# Patient Record
Sex: MALE | Race: WHITE
[De-identification: names, ages, dates, MRNs, and addresses within clinical notes are randomized per-mention and may not be internally consistent; named-entity substitution may affect disease eponyms.]

---

## 2020-07-31 ENCOUNTER — HOSPITAL ENCOUNTER (OUTPATIENT)
Dept: HOSPITAL 19 - COL.RAD | Age: 40
End: 2020-07-31
Attending: SURGERY
Payer: OTHER GOVERNMENT

## 2020-07-31 DIAGNOSIS — K44.9: Primary | ICD-10-CM

## 2020-07-31 DIAGNOSIS — K21.0: ICD-10-CM

## 2020-09-16 ENCOUNTER — HOSPITAL ENCOUNTER (OUTPATIENT)
Dept: HOSPITAL 19 - SDCO | Age: 40
LOS: 1 days | Discharge: HOME | End: 2020-09-17
Attending: SURGERY
Payer: OTHER GOVERNMENT

## 2020-09-16 VITALS — SYSTOLIC BLOOD PRESSURE: 148 MMHG | HEART RATE: 84 BPM | DIASTOLIC BLOOD PRESSURE: 92 MMHG

## 2020-09-16 VITALS — BODY MASS INDEX: 26.96 KG/M2 | HEIGHT: 68 IN | WEIGHT: 177.91 LBS

## 2020-09-16 VITALS — DIASTOLIC BLOOD PRESSURE: 93 MMHG | SYSTOLIC BLOOD PRESSURE: 144 MMHG | HEART RATE: 82 BPM | TEMPERATURE: 97.5 F

## 2020-09-16 VITALS — HEART RATE: 72 BPM | TEMPERATURE: 97.6 F

## 2020-09-16 VITALS — DIASTOLIC BLOOD PRESSURE: 70 MMHG | SYSTOLIC BLOOD PRESSURE: 125 MMHG | HEART RATE: 96 BPM | TEMPERATURE: 98.8 F

## 2020-09-16 VITALS — SYSTOLIC BLOOD PRESSURE: 146 MMHG | HEART RATE: 87 BPM | DIASTOLIC BLOOD PRESSURE: 91 MMHG

## 2020-09-16 VITALS — DIASTOLIC BLOOD PRESSURE: 94 MMHG | SYSTOLIC BLOOD PRESSURE: 143 MMHG | HEART RATE: 87 BPM

## 2020-09-16 VITALS — HEART RATE: 79 BPM | SYSTOLIC BLOOD PRESSURE: 143 MMHG | DIASTOLIC BLOOD PRESSURE: 91 MMHG

## 2020-09-16 VITALS — SYSTOLIC BLOOD PRESSURE: 143 MMHG | HEART RATE: 84 BPM | DIASTOLIC BLOOD PRESSURE: 88 MMHG

## 2020-09-16 VITALS — HEART RATE: 96 BPM | DIASTOLIC BLOOD PRESSURE: 83 MMHG | SYSTOLIC BLOOD PRESSURE: 139 MMHG

## 2020-09-16 DIAGNOSIS — K21.0: Primary | ICD-10-CM

## 2020-09-16 DIAGNOSIS — K44.9: ICD-10-CM

## 2020-09-16 DIAGNOSIS — Z20.828: ICD-10-CM

## 2020-09-16 PROCEDURE — A9284 NON-ELECTRONIC SPIROMETER: HCPCS

## 2020-09-16 NOTE — NUR
Patient awaken sitting in bed. A&Ox3, denies pain. VSS. Reporting some
discomfort in face. Crepitus noted in upper torso, by clavicals and in face,
checks and neck. Voice also sounds nasally. Doctor Kasey notified and just
continued monitoring the patient. Patient informed that it will go away with
time and to call nursing staff for any concerns. No further needs expressed
from the patient. Call light within reach

## 2020-09-16 NOTE — NUR
Patient reports inability to have a deep breath at times. Patient educated on
ambulating and given IS by respiratory. Patient has crepitus and is 93% on
room air. Will continue to monitor.

## 2020-09-16 NOTE — NUR
PATIENT ADMITED INTO ROOM 322-2 POST OP ROBOTIC NISSEN WITH HERNIA REPAIR. ABD
LAP SITES X6 ARE CD&I AND CLOSED WITH GLUE. ABD IS DISTENDED, SL FIRM AND WITH
POSITIVE BOWL SOUNDS. NO C/O N/V. NOTED CREPITUS IN FACE, NECK & COLLAR BONE.
PATIENT C/O SL GAS PAINS IN SHOULDERS. NURSING EDUCATED PATIENT AND WIFE
ABOUT POST OP ROBOTIC PROCEDURE AND ERAS PROTOCOL. HEAD TO TOE ASSESSMENT
COMPLETE. ORIENTED TO ROOM. CALL LIGHT IN REACH. WIFE AT BEDSIDE.

## 2020-09-16 NOTE — NUR
Patient sitting up in bed eatting dinner. No complaints of pain or nausea.
VSS. A&O. IV CDI. No further needs expressed from the patient. Call light
within reach.

## 2020-09-16 NOTE — NUR
PATIENT ABLE TO VOID 200CC POST OP USING THE URINAL. PATIENT C/O SL NAUSEA, NO
EMESIS. GAVE PRN ZOFRAN. LIQUIDS AT BEDSIDE. PATIENT RESTING. WIFE AT BEDSIDE.

## 2020-09-16 NOTE — NUR
Resting in bed. Assessment complete. Lungs clear. Heart sounds normal. Bowels
hypoactive at this time. Pulses present throughout. No edema noted. INT left
wrist without complications. Denies pain but reports discomfort in upper chest
and facial areas. Patient has crepitus. Educated to ambulate this evening.
Denies other needs at this time. Denies needs for tylenol. Will monitor.

## 2020-09-17 VITALS — SYSTOLIC BLOOD PRESSURE: 123 MMHG | TEMPERATURE: 99.1 F | HEART RATE: 94 BPM | DIASTOLIC BLOOD PRESSURE: 69 MMHG

## 2020-09-17 VITALS — SYSTOLIC BLOOD PRESSURE: 132 MMHG | HEART RATE: 85 BPM | DIASTOLIC BLOOD PRESSURE: 68 MMHG | TEMPERATURE: 98.9 F

## 2020-09-17 VITALS — SYSTOLIC BLOOD PRESSURE: 127 MMHG | DIASTOLIC BLOOD PRESSURE: 76 MMHG | TEMPERATURE: 98.5 F | HEART RATE: 90 BPM

## 2020-09-17 NOTE — NUR
PATIENT IS A&O. VSS. REPORTS ABD TENDERNESS AS MILD AND DENIES NEED FOR PAIN
MEDS. ABD IS SL DISTENDEND, MORE SOFT THAN YESTERDAY, AND WITH POSITIVE BOWL
SOUNDS. NO C/O N/V. TOLERATING CLEAR LIQUID DIET WELL. IV TO INT. ABD LAP
SITES X6 ARE CD&I WITH GLUED CLOSURE. HEAD TO TOE ASSESSMENT COMPLETE. STUDENT
NURSE WORKING WITH PATIENT TODAY, SEE STUDENT CHARTING. PATIENT HOPING TO
DISCHARGE HOME LATER TODAY.

## 2020-09-17 NOTE — NUR
Patient had uneventful night. Mild discomfort with crepitus. Resting in bed
this AM. Call light in reach.

## 2020-09-17 NOTE — NUR
PATIENT DISCHARGING HOME VIA AMBULATORY TO PERSONAL VEHICLE. GAVE DISCHARGE
INSTRUCTIONS, PRESCRIPTION AND F/U APT. ANSWERED ALL QUESTIONS/CONCERNS.
STUDENT NURSE DC'D IV SITE, SEE CHARTING. PATIENT DRESSED AND PACKED PERSONAL
BELONGINGS. PATIENT DISCHARGED.

## 2020-09-17 NOTE — NUR
Pt reports having "slight cough that is sometimes productive" and "It doesn't
happen very often. Just a couple of times since surgery." Pt did not
display productive cough during nursing assessment for evaluation. Education
on IS and splenting ABD during cough provided. Patient verbally displayed
understanding of teachings.

## 2021-01-29 ENCOUNTER — HOSPITAL ENCOUNTER (OUTPATIENT)
Dept: HOSPITAL 19 - SDCO | Age: 41
Discharge: HOME | End: 2021-01-29
Attending: SPECIALIST
Payer: OTHER GOVERNMENT

## 2021-01-29 VITALS — HEIGHT: 68 IN | WEIGHT: 172.84 LBS | BODY MASS INDEX: 26.2 KG/M2

## 2021-01-29 VITALS — SYSTOLIC BLOOD PRESSURE: 131 MMHG | HEART RATE: 77 BPM | DIASTOLIC BLOOD PRESSURE: 98 MMHG

## 2021-01-29 VITALS — SYSTOLIC BLOOD PRESSURE: 139 MMHG | HEART RATE: 63 BPM | DIASTOLIC BLOOD PRESSURE: 90 MMHG

## 2021-01-29 VITALS — SYSTOLIC BLOOD PRESSURE: 134 MMHG | HEART RATE: 78 BPM | TEMPERATURE: 97.6 F | DIASTOLIC BLOOD PRESSURE: 96 MMHG

## 2021-01-29 VITALS — TEMPERATURE: 97.3 F | DIASTOLIC BLOOD PRESSURE: 89 MMHG | SYSTOLIC BLOOD PRESSURE: 125 MMHG | HEART RATE: 81 BPM

## 2021-01-29 VITALS — SYSTOLIC BLOOD PRESSURE: 124 MMHG | DIASTOLIC BLOOD PRESSURE: 86 MMHG | HEART RATE: 70 BPM

## 2021-01-29 DIAGNOSIS — K64.4: ICD-10-CM

## 2021-01-29 DIAGNOSIS — K64.2: ICD-10-CM

## 2021-01-29 DIAGNOSIS — K58.9: ICD-10-CM

## 2021-01-29 DIAGNOSIS — K92.1: Primary | ICD-10-CM

## 2021-01-29 DIAGNOSIS — E78.00: ICD-10-CM

## 2021-01-29 DIAGNOSIS — Z98.52: ICD-10-CM

## 2021-01-29 DIAGNOSIS — E78.5: ICD-10-CM

## 2021-01-29 DIAGNOSIS — Z20.822: ICD-10-CM

## 2021-01-29 DIAGNOSIS — K21.9: ICD-10-CM

## 2021-01-29 DIAGNOSIS — I10: ICD-10-CM

## 2021-01-29 NOTE — NUR
PT RETURNED PER CART. PT A/OX3, DENIES PAIN, NAUSEA OR VOMITING. VSS,
AFEBRILE. LUNGS CLEAR, HRR, BOWEL SOUNDS AUDIBLE. PT REQUESTING APPLESAUCE AND
APPLE JUICE. TOLERATING WELL.

## 2021-01-29 NOTE — NUR
PT TOLERATING PO WITHOUT DIFFICULTY. DENIES PAIN OR NAUSEA AT THIS TIME. #22
DC'D PER LEFT HAND. DIMISSAL INSTRUCTIONS GIVEN, PT DENIES QUESTION. PT
SIGNED.